# Patient Record
Sex: MALE | Race: WHITE
[De-identification: names, ages, dates, MRNs, and addresses within clinical notes are randomized per-mention and may not be internally consistent; named-entity substitution may affect disease eponyms.]

---

## 2018-06-27 ENCOUNTER — HOSPITAL ENCOUNTER (OUTPATIENT)
Dept: HOSPITAL 47 - LABPAT | Age: 43
Discharge: HOME | End: 2018-06-27
Attending: ANESTHESIOLOGY
Payer: MEDICAID

## 2018-06-27 DIAGNOSIS — Z01.812: ICD-10-CM

## 2018-06-27 DIAGNOSIS — Z01.818: Primary | ICD-10-CM

## 2018-06-27 PROCEDURE — 93005 ELECTROCARDIOGRAM TRACING: CPT

## 2018-06-27 PROCEDURE — 36415 COLL VENOUS BLD VENIPUNCTURE: CPT

## 2018-06-27 PROCEDURE — 84132 ASSAY OF SERUM POTASSIUM: CPT

## 2018-06-28 ENCOUNTER — HOSPITAL ENCOUNTER (OUTPATIENT)
Dept: HOSPITAL 47 - OR | Age: 43
Discharge: HOME | End: 2018-06-28
Attending: OTOLARYNGOLOGY
Payer: MEDICAID

## 2018-06-28 VITALS — SYSTOLIC BLOOD PRESSURE: 130 MMHG | DIASTOLIC BLOOD PRESSURE: 76 MMHG | HEART RATE: 68 BPM

## 2018-06-28 VITALS — BODY MASS INDEX: 41.3 KG/M2

## 2018-06-28 VITALS — RESPIRATION RATE: 18 BRPM

## 2018-06-28 VITALS — TEMPERATURE: 96.8 F

## 2018-06-28 DIAGNOSIS — Z91.09: ICD-10-CM

## 2018-06-28 DIAGNOSIS — I10: ICD-10-CM

## 2018-06-28 DIAGNOSIS — Z79.899: ICD-10-CM

## 2018-06-28 DIAGNOSIS — E66.9: ICD-10-CM

## 2018-06-28 DIAGNOSIS — D17.0: Primary | ICD-10-CM

## 2018-06-28 DIAGNOSIS — Z87.891: ICD-10-CM

## 2018-06-28 PROCEDURE — 21552 EXC NECK LES SC 3 CM/>: CPT

## 2018-06-28 PROCEDURE — 88304 TISSUE EXAM BY PATHOLOGIST: CPT

## 2018-06-28 NOTE — P.PCN
Date of Procedure: 06/28/18


Preoperative Diagnosis: 


8 x 4 cm right anterior neck lipoma


Postoperative Diagnosis: 


Same


Procedure(s) Performed: 


Excision of an 8 x 4 cm right anterior neck lipoma with complex closure.


Anesthesia: GETA


Surgeon: Brent Thompson


Estimated Blood Loss (ml): 5


Pathology: other (Neck mass)


Condition: stable


Disposition: PACU


Indications for Procedure: 


This patient presented to the office with a large mass measuring about 8 x 4 

cm.  It growing changing getting larger.  Ultrasound was performed along with 

an laboratory evaluation.  Mother has a history of lipomas.  The appearance was 

soft and compressible and lipoma diagnosis is most likely etiology.


Operative Findings: 


Large fatty tumor measuring 8 x 4 cm


Description of Procedure: 


This patient was taken to the operative room and placed in the supine position.

  A general inhalation anesthetic was administered the patient and an LMA was 

inserted.  The neck was sterilely prepped and draped in usual fashion and the 

incision line was marked in a crease line of the neck.  This was injected with 

lidocaine 1% with epinephrine 1 100,000.  An incision was made with a 15 blade 

this scan was retracted and dissection was carried down to this lipomatous 

growth.  With use of a delicate plastic scissors and a Brown-Adson forceps the 

lipomatous growth was completely removed.  Hemostasis was complete with 

cauterization.  The area was inspected and irrigated.  No bleeding was 

encountered.  We closed this in a complex fashion closing the subcutaneous 

tissue with a 4-0 Monocryl we closed the deep dermal layer with 4-0 Monocryl we 

closed the mid dermal layer with 4-0 Monocryl we closed the skin with a 5-0 

Prolene in a running nonlocking fashion.  Steri-Strips were applied and a 

Medipore compression dressing was applied.  The patient is to take the Medpor 

compression dressing off in 6 hours.

## 2021-04-15 ENCOUNTER — HOSPITAL ENCOUNTER (OUTPATIENT)
Dept: HOSPITAL 47 - BARWHC3 | Age: 46
End: 2021-04-15
Attending: SURGERY
Payer: MEDICAID

## 2021-04-15 VITALS — TEMPERATURE: 98.3 F | DIASTOLIC BLOOD PRESSURE: 100 MMHG | SYSTOLIC BLOOD PRESSURE: 174 MMHG | HEART RATE: 76 BPM

## 2021-04-15 VITALS — BODY MASS INDEX: 44.7 KG/M2

## 2021-04-15 DIAGNOSIS — Z87.891: ICD-10-CM

## 2021-04-15 DIAGNOSIS — I10: ICD-10-CM

## 2021-04-15 DIAGNOSIS — E66.01: Primary | ICD-10-CM

## 2021-04-15 PROCEDURE — 99211 OFF/OP EST MAY X REQ PHY/QHP: CPT
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
## 2021-07-13 ENCOUNTER — HOSPITAL ENCOUNTER (OUTPATIENT)
Dept: HOSPITAL 47 - ORWHC2ENDO | Age: 46
Discharge: HOME | End: 2021-07-13
Attending: SURGERY
Payer: MEDICAID

## 2021-07-13 VITALS — SYSTOLIC BLOOD PRESSURE: 129 MMHG | DIASTOLIC BLOOD PRESSURE: 84 MMHG | HEART RATE: 82 BPM

## 2021-07-13 VITALS — BODY MASS INDEX: 42 KG/M2

## 2021-07-13 VITALS — RESPIRATION RATE: 16 BRPM | TEMPERATURE: 97.7 F

## 2021-07-13 DIAGNOSIS — K21.9: ICD-10-CM

## 2021-07-13 DIAGNOSIS — Z87.891: ICD-10-CM

## 2021-07-13 DIAGNOSIS — Z91.048: ICD-10-CM

## 2021-07-13 DIAGNOSIS — K29.50: Primary | ICD-10-CM

## 2021-07-13 DIAGNOSIS — Z79.899: ICD-10-CM

## 2021-07-13 DIAGNOSIS — I10: ICD-10-CM

## 2021-07-13 PROCEDURE — 43239 EGD BIOPSY SINGLE/MULTIPLE: CPT

## 2021-07-13 PROCEDURE — 88305 TISSUE EXAM BY PATHOLOGIST: CPT

## 2021-07-13 NOTE — P.PCN
Date of Procedure: 07/13/21


Procedure(s) Performed: 


Preoperative Dx: presurgical, GERD 


Postoperative Dx: mild gastritis 


Procedure: EGD with Bx


Anesthesia: Sedation


Endoscopist: Dr. West


Specimens: Antrum 


Endoscopic Procedure:   The patient was on the endoscopy table in the left 

decubitus position.  The Olympus gastroscope was inserted into the oropharynx 

and passed under direct visualization to the region of the third portion of the 

duodenum.  From that point the scope was slowly withdrawn inspecting all 

surfaces carefully.  There were no neoplastic inflammatory or polypoid lesions 

throughout the duodenum.  The pylorus was widely patent.  The stomach was 

carefully inspected.  There was mild gastritis present.  A biopsy of the antrum 

took place to rule out H. pylori.  Retroflexion revealed a normal hiatus.  The 

esophagus was then carefully examined.  There were no neoplastic inflammatory or

polypoid lesions throughout the visualized esophagus.  The patient was then 

taken to the recovery room in stable condition per anesthesia guidelines.


Recommendations: resuem diet.  Await biopsy results
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
